# Patient Record
Sex: FEMALE | Race: WHITE | NOT HISPANIC OR LATINO
[De-identification: names, ages, dates, MRNs, and addresses within clinical notes are randomized per-mention and may not be internally consistent; named-entity substitution may affect disease eponyms.]

---

## 2018-03-09 ENCOUNTER — APPOINTMENT (OUTPATIENT)
Dept: HEART AND VASCULAR | Facility: CLINIC | Age: 33
End: 2018-03-09
Payer: OTHER GOVERNMENT

## 2018-03-09 VITALS
DIASTOLIC BLOOD PRESSURE: 82 MMHG | HEART RATE: 68 BPM | BODY MASS INDEX: 24.15 KG/M2 | SYSTOLIC BLOOD PRESSURE: 110 MMHG | RESPIRATION RATE: 24 BRPM | HEIGHT: 60 IN | WEIGHT: 123 LBS

## 2018-03-09 DIAGNOSIS — Z87.01 PERSONAL HISTORY OF PNEUMONIA (RECURRENT): ICD-10-CM

## 2018-03-09 PROCEDURE — 99204 OFFICE O/P NEW MOD 45 MIN: CPT

## 2018-03-09 PROCEDURE — 93228 REMOTE 30 DAY ECG REV/REPORT: CPT

## 2018-03-09 RX ORDER — OMEPRAZOLE 40 MG/1
40 CAPSULE, DELAYED RELEASE ORAL
Qty: 90 | Refills: 0 | Status: DISCONTINUED | COMMUNITY
Start: 2018-01-26

## 2018-03-09 RX ORDER — OMEPRAZOLE 40 MG/1
40 CAPSULE, DELAYED RELEASE ORAL
Qty: 90 | Refills: 0 | Status: ACTIVE | COMMUNITY
Start: 2018-01-26

## 2018-03-09 RX ORDER — CLARITHROMYCIN 250 MG/1
250 TABLET, FILM COATED ORAL
Qty: 20 | Refills: 0 | Status: DISCONTINUED | COMMUNITY
Start: 2017-12-01

## 2018-03-09 RX ORDER — BENZONATATE 100 MG/1
100 CAPSULE ORAL
Qty: 60 | Refills: 0 | Status: DISCONTINUED | COMMUNITY
Start: 2017-12-01

## 2018-03-12 ENCOUNTER — APPOINTMENT (OUTPATIENT)
Dept: HEART AND VASCULAR | Facility: CLINIC | Age: 33
End: 2018-03-12
Payer: OTHER GOVERNMENT

## 2018-03-12 PROCEDURE — 94010 BREATHING CAPACITY TEST: CPT | Mod: 59

## 2018-03-12 PROCEDURE — 94621 CARDIOPULM EXERCISE TESTING: CPT

## 2018-03-12 PROCEDURE — 94727 GAS DIL/WSHOT DETER LNG VOL: CPT

## 2018-03-12 PROCEDURE — 94729 DIFFUSING CAPACITY: CPT

## 2018-03-15 ENCOUNTER — APPOINTMENT (OUTPATIENT)
Dept: HEART AND VASCULAR | Facility: CLINIC | Age: 33
End: 2018-03-15
Payer: OTHER GOVERNMENT

## 2018-03-15 PROCEDURE — 93306 TTE W/DOPPLER COMPLETE: CPT

## 2018-03-16 ENCOUNTER — RESULT REVIEW (OUTPATIENT)
Age: 33
End: 2018-03-16

## 2018-03-22 ENCOUNTER — RX RENEWAL (OUTPATIENT)
Age: 33
End: 2018-03-22

## 2018-03-29 ENCOUNTER — RESULT REVIEW (OUTPATIENT)
Age: 33
End: 2018-03-29

## 2018-04-12 ENCOUNTER — RESULT REVIEW (OUTPATIENT)
Age: 33
End: 2018-04-12

## 2018-05-01 ENCOUNTER — RESULT REVIEW (OUTPATIENT)
Age: 33
End: 2018-05-01

## 2018-05-02 ENCOUNTER — RESULT REVIEW (OUTPATIENT)
Age: 33
End: 2018-05-02

## 2018-05-07 ENCOUNTER — APPOINTMENT (OUTPATIENT)
Dept: HEART AND VASCULAR | Facility: CLINIC | Age: 33
End: 2018-05-07
Payer: OTHER GOVERNMENT

## 2018-05-07 VITALS
WEIGHT: 119 LBS | RESPIRATION RATE: 20 BRPM | HEIGHT: 60 IN | SYSTOLIC BLOOD PRESSURE: 110 MMHG | BODY MASS INDEX: 23.36 KG/M2 | DIASTOLIC BLOOD PRESSURE: 70 MMHG | HEART RATE: 88 BPM

## 2018-05-07 PROCEDURE — 99214 OFFICE O/P EST MOD 30 MIN: CPT

## 2018-05-07 RX ORDER — METOPROLOL SUCCINATE 50 MG/1
50 TABLET, EXTENDED RELEASE ORAL
Qty: 2 | Refills: 0 | Status: DISCONTINUED | COMMUNITY
Start: 2018-03-22 | End: 2018-05-07

## 2018-06-05 ENCOUNTER — RX RENEWAL (OUTPATIENT)
Age: 33
End: 2018-06-05

## 2018-06-21 ENCOUNTER — APPOINTMENT (OUTPATIENT)
Dept: HEART AND VASCULAR | Facility: CLINIC | Age: 33
End: 2018-06-21
Payer: OTHER GOVERNMENT

## 2018-06-21 VITALS
WEIGHT: 121 LBS | RESPIRATION RATE: 24 BRPM | SYSTOLIC BLOOD PRESSURE: 90 MMHG | HEIGHT: 60 IN | DIASTOLIC BLOOD PRESSURE: 68 MMHG | BODY MASS INDEX: 23.75 KG/M2 | HEART RATE: 60 BPM

## 2018-06-21 PROCEDURE — 99214 OFFICE O/P EST MOD 30 MIN: CPT

## 2018-06-21 RX ORDER — DOXYCYCLINE HYCLATE 100 MG/1
100 TABLET ORAL
Qty: 14 | Refills: 0 | Status: DISCONTINUED | COMMUNITY
Start: 2018-05-31

## 2018-06-21 RX ORDER — BENZONATATE 200 MG/1
200 CAPSULE ORAL
Qty: 15 | Refills: 0 | Status: DISCONTINUED | COMMUNITY
Start: 2018-05-31

## 2018-06-21 RX ORDER — PREDNISONE 10 MG/1
10 TABLET ORAL
Qty: 30 | Refills: 0 | Status: DISCONTINUED | COMMUNITY
Start: 2018-05-31

## 2018-08-21 ENCOUNTER — RX RENEWAL (OUTPATIENT)
Age: 33
End: 2018-08-21

## 2018-12-06 ENCOUNTER — APPOINTMENT (OUTPATIENT)
Dept: HEART AND VASCULAR | Facility: CLINIC | Age: 33
End: 2018-12-06
Payer: OTHER GOVERNMENT

## 2018-12-06 VITALS
DIASTOLIC BLOOD PRESSURE: 54 MMHG | WEIGHT: 122 LBS | SYSTOLIC BLOOD PRESSURE: 107 MMHG | HEART RATE: 60 BPM | RESPIRATION RATE: 24 BRPM | HEIGHT: 60 IN | BODY MASS INDEX: 23.95 KG/M2

## 2018-12-06 PROCEDURE — 99214 OFFICE O/P EST MOD 30 MIN: CPT

## 2018-12-06 RX ORDER — MULTIVIT-MIN/VIT C/HERB NO.124 250-11.66
TABLET,CHEWABLE ORAL
Refills: 0 | Status: ACTIVE | COMMUNITY

## 2018-12-06 NOTE — REVIEW OF SYSTEMS
[Fever] : no fever [Headache] : no headache [Chills] : no chills [Feeling Fatigued] : not feeling fatigued [Abdominal Pain] : no abdominal pain [Nausea] : no nausea [Vomiting] : no vomiting [Heartburn] : no heartburn [Change in Appetite] : no change in appetite [Change In The Stool] : no change in stool [Dysphagia] : no dysphagia [Dizziness] : no dizziness [Tremor] : no tremor was seen [Numbness (Hypesthesia)] : no numbness [Convulsions] : no convulsions [Tingling (Paresthesia)] : no tingling [Negative] : Heme/Lymph

## 2018-12-06 NOTE — HISTORY OF PRESENT ILLNESS
[FreeTextEntry1] : Harper Coffman returns for follow up.  She denies cp, sob, pnd, orthopnea, edema, or loc.  She has fleeting lightheadedness with palps - much improved.\par \par She is active and compliant with meds.\par \par

## 2018-12-06 NOTE — DISCUSSION/SUMMARY
[Possible Cardiac Ischemia (Intermd Prob)] : possible cardiac ischemia (intermediate probability) [Stable] : stable [Deteriorating] : deteriorating [None] : none [Dietary Modification] : dietary modification [Exercise Regimen] : an exercise regimen [Vasovagal Syncope] : vasovagal syncope [Improving] : improving [Patient] : the patient [de-identified] : autonomic dysfxn

## 2018-12-06 NOTE — PHYSICAL EXAM
[General Appearance - Well Developed] : well developed [Normal Appearance] : normal appearance [Well Groomed] : well groomed [General Appearance - Well Nourished] : well nourished [No Deformities] : no deformities [General Appearance - In No Acute Distress] : no acute distress [Normal Conjunctiva] : the conjunctiva exhibited no abnormalities [Eyelids - No Xanthelasma] : the eyelids demonstrated no xanthelasmas [Normal Oral Mucosa] : normal oral mucosa [No Oral Pallor] : no oral pallor [No Oral Cyanosis] : no oral cyanosis [Normal Jugular Venous A Waves Present] : normal jugular venous A waves present [Normal Jugular Venous V Waves Present] : normal jugular venous V waves present [No Jugular Venous Clemens A Waves] : no jugular venous clemens A waves [Respiration, Rhythm And Depth] : normal respiratory rhythm and effort [Exaggerated Use Of Accessory Muscles For Inspiration] : no accessory muscle use [Auscultation Breath Sounds / Voice Sounds] : lungs were clear to auscultation bilaterally [Heart Rate And Rhythm] : heart rate and rhythm were normal [Heart Sounds] : normal S1 and S2 [Murmurs] : no murmurs present [Abdomen Soft] : soft [Abdomen Tenderness] : non-tender [Abdomen Mass (___ Cm)] : no abdominal mass palpated [Abnormal Walk] : normal gait [Gait - Sufficient For Exercise Testing] : the gait was sufficient for exercise testing [Nail Clubbing] : no clubbing of the fingernails [Cyanosis, Localized] : no localized cyanosis [Petechial Hemorrhages (___cm)] : no petechial hemorrhages [Skin Color & Pigmentation] : normal skin color and pigmentation [] : no rash [No Venous Stasis] : no venous stasis [Skin Lesions] : no skin lesions [No Skin Ulcers] : no skin ulcer [No Xanthoma] : no  xanthoma was observed

## 2018-12-06 NOTE — REASON FOR VISIT
[Follow-Up - Clinic] : a clinic follow-up of [Coronary Artery Disease] : coronary artery disease [Syncope] : syncope

## 2019-03-11 ENCOUNTER — APPOINTMENT (OUTPATIENT)
Dept: HEART AND VASCULAR | Facility: CLINIC | Age: 34
End: 2019-03-11
Payer: OTHER GOVERNMENT

## 2019-03-11 VITALS
BODY MASS INDEX: 23.95 KG/M2 | HEART RATE: 59 BPM | DIASTOLIC BLOOD PRESSURE: 62 MMHG | SYSTOLIC BLOOD PRESSURE: 102 MMHG | WEIGHT: 122 LBS | HEIGHT: 60 IN

## 2019-03-11 PROCEDURE — 93351 STRESS TTE COMPLETE: CPT

## 2019-03-11 PROCEDURE — 93325 DOPPLER ECHO COLOR FLOW MAPG: CPT

## 2019-03-11 PROCEDURE — 93320 DOPPLER ECHO COMPLETE: CPT

## 2019-03-12 ENCOUNTER — RESULT REVIEW (OUTPATIENT)
Age: 34
End: 2019-03-12

## 2019-03-18 ENCOUNTER — APPOINTMENT (OUTPATIENT)
Dept: HEART AND VASCULAR | Facility: CLINIC | Age: 34
End: 2019-03-18
Payer: OTHER GOVERNMENT

## 2019-03-18 PROCEDURE — 94727 GAS DIL/WSHOT DETER LNG VOL: CPT

## 2019-03-18 PROCEDURE — 94729 DIFFUSING CAPACITY: CPT

## 2019-03-18 PROCEDURE — 94010 BREATHING CAPACITY TEST: CPT | Mod: 59

## 2019-03-18 PROCEDURE — 94621 CARDIOPULM EXERCISE TESTING: CPT

## 2019-04-01 ENCOUNTER — APPOINTMENT (OUTPATIENT)
Dept: HEART AND VASCULAR | Facility: CLINIC | Age: 34
End: 2019-04-01
Payer: OTHER GOVERNMENT

## 2019-04-01 VITALS
HEART RATE: 64 BPM | HEIGHT: 60 IN | BODY MASS INDEX: 23.56 KG/M2 | RESPIRATION RATE: 24 BRPM | SYSTOLIC BLOOD PRESSURE: 96 MMHG | WEIGHT: 120 LBS | DIASTOLIC BLOOD PRESSURE: 64 MMHG

## 2019-04-01 PROCEDURE — 99214 OFFICE O/P EST MOD 30 MIN: CPT

## 2019-04-01 RX ORDER — TINIDAZOLE 500 MG/1
500 TABLET, FILM COATED ORAL
Qty: 4 | Refills: 0 | Status: DISCONTINUED | COMMUNITY
Start: 2019-01-10

## 2019-04-01 NOTE — REASON FOR VISIT
[Follow-Up - Clinic] : a clinic follow-up of [Coronary Artery Disease] : coronary artery disease [FreeTextEntry1] : autonomic dysfxn

## 2019-04-01 NOTE — HISTORY OF PRESENT ILLNESS
[FreeTextEntry1] : Harper Coffman returns for follow up.  She has occasional sob, flutter sensation, and lightheadedness.  She denies cp, pnd, orthopnea, edema, or loc.  \par \par She is active but not exercising.  She is compliant with meds.\par \par EXSE 3/2019: nl lv sys fxn; nl rojo fxn; 11:10 min Aldair; boderline/pos ECG; no ischemia byWM\par CPET 3/2019: ischemic myocardial dysfxn; 61% predicted peak VO2\par \par Reviewed results in detail.  Recommend increase exercise and continue supplements/meds.

## 2019-04-01 NOTE — DISCUSSION/SUMMARY
[Possible Cardiac Ischemia (Intermd Prob)] : possible cardiac ischemia (intermediate probability) [Stable] : stable [Deteriorating] : deteriorating [None] : none [Dietary Modification] : dietary modification [Exercise Regimen] : an exercise regimen [Patient] : the patient [de-identified] : endothelial dysfxn [FreeTextEntry1] : autonomic dysfxn - stable

## 2019-04-11 ENCOUNTER — APPOINTMENT (OUTPATIENT)
Dept: HEART AND VASCULAR | Facility: CLINIC | Age: 34
End: 2019-04-11
Payer: OTHER GOVERNMENT

## 2019-04-11 VITALS
BODY MASS INDEX: 23.56 KG/M2 | RESPIRATION RATE: 14 BRPM | WEIGHT: 120 LBS | SYSTOLIC BLOOD PRESSURE: 112 MMHG | DIASTOLIC BLOOD PRESSURE: 72 MMHG | HEIGHT: 60 IN | HEART RATE: 80 BPM

## 2019-04-11 PROCEDURE — 99214 OFFICE O/P EST MOD 30 MIN: CPT

## 2019-04-11 RX ORDER — BACILLUS COAGULANS/INULIN 1B-250 MG
CAPSULE ORAL
Refills: 0 | Status: DISCONTINUED | COMMUNITY
End: 2019-04-11

## 2019-04-11 NOTE — HISTORY OF PRESENT ILLNESS
[FreeTextEntry1] : Harper Coffman returns for follow up.  She was treated for sinus infection with po steroids and antibiotics last week.   A few days ago, she developed "funny" sensation in her chest and breathing while in a store after feeling like she was going to faint.  She did not lose consciousness.  The next couple of days she was feeling tired and "racy" in her chest.  She denies cp, pnd, orthopnea, edema.  [facility] HV eval was negative.\par \par Today, she denies cp, sob, pnd, orthopnea, edema, palp, or loc.\par \par She is off steroid taper and taking amoxicillin only.\par \par She is active again and compliant with meds.\par \par Symptoms likely side effect of steroids in setting of autonomic dysfxn.

## 2019-04-11 NOTE — DISCUSSION/SUMMARY
[Possible Cardiac Ischemia (Intermd Prob)] : possible cardiac ischemia (intermediate probability) [Deteriorating] : deteriorating [None] : none [Dietary Modification] : dietary modification [Exercise Regimen] : an exercise regimen [Vasovagal Syncope] : vasovagal syncope [Stable] : stable [Patient] : the patient [de-identified] : autonomic dysfxn

## 2019-04-11 NOTE — PHYSICAL EXAM
[Normal Appearance] : normal appearance [Well Groomed] : well groomed [General Appearance - Well Developed] : well developed [General Appearance - Well Nourished] : well nourished [No Deformities] : no deformities [General Appearance - In No Acute Distress] : no acute distress [Eyelids - No Xanthelasma] : the eyelids demonstrated no xanthelasmas [Normal Conjunctiva] : the conjunctiva exhibited no abnormalities [Normal Oral Mucosa] : normal oral mucosa [No Oral Pallor] : no oral pallor [No Oral Cyanosis] : no oral cyanosis [Normal Jugular Venous A Waves Present] : normal jugular venous A waves present [Normal Jugular Venous V Waves Present] : normal jugular venous V waves present [No Jugular Venous Clemens A Waves] : no jugular venous clemens A waves [Respiration, Rhythm And Depth] : normal respiratory rhythm and effort [Exaggerated Use Of Accessory Muscles For Inspiration] : no accessory muscle use [Auscultation Breath Sounds / Voice Sounds] : lungs were clear to auscultation bilaterally [Heart Rate And Rhythm] : heart rate and rhythm were normal [Heart Sounds] : normal S1 and S2 [Murmurs] : no murmurs present [Abdomen Soft] : soft [Abdomen Tenderness] : non-tender [Abdomen Mass (___ Cm)] : no abdominal mass palpated [Abnormal Walk] : normal gait [Gait - Sufficient For Exercise Testing] : the gait was sufficient for exercise testing [Nail Clubbing] : no clubbing of the fingernails [Cyanosis, Localized] : no localized cyanosis [Petechial Hemorrhages (___cm)] : no petechial hemorrhages [Skin Color & Pigmentation] : normal skin color and pigmentation [] : no rash [No Venous Stasis] : no venous stasis [No Skin Ulcers] : no skin ulcer [Skin Lesions] : no skin lesions [No Xanthoma] : no  xanthoma was observed

## 2019-04-11 NOTE — REVIEW OF SYSTEMS
[Fever] : no fever [Chills] : no chills [Headache] : no headache [Feeling Fatigued] : not feeling fatigued [Nausea] : no nausea [Vomiting] : no vomiting [Abdominal Pain] : no abdominal pain [Change in Appetite] : no change in appetite [Change In The Stool] : no change in stool [Heartburn] : no heartburn [Dizziness] : no dizziness [Dysphagia] : no dysphagia [Tremor] : no tremor was seen [Convulsions] : no convulsions [Tingling (Paresthesia)] : no tingling [Numbness (Hypesthesia)] : no numbness [Negative] : Endocrine

## 2019-04-11 NOTE — REASON FOR VISIT
[Coronary Artery Disease] : coronary artery disease [Follow-Up - Clinic] : a clinic follow-up of [FreeTextEntry1] : autonomic dysfxn

## 2019-08-16 ENCOUNTER — RX RENEWAL (OUTPATIENT)
Age: 34
End: 2019-08-16

## 2019-09-09 ENCOUNTER — RX RENEWAL (OUTPATIENT)
Age: 34
End: 2019-09-09

## 2019-11-04 ENCOUNTER — APPOINTMENT (OUTPATIENT)
Dept: HEART AND VASCULAR | Facility: CLINIC | Age: 34
End: 2019-11-04
Payer: OTHER GOVERNMENT

## 2019-11-04 VITALS
BODY MASS INDEX: 23.16 KG/M2 | RESPIRATION RATE: 16 BRPM | SYSTOLIC BLOOD PRESSURE: 90 MMHG | WEIGHT: 118 LBS | DIASTOLIC BLOOD PRESSURE: 68 MMHG | HEART RATE: 68 BPM | HEIGHT: 60 IN

## 2019-11-04 PROCEDURE — 99214 OFFICE O/P EST MOD 30 MIN: CPT

## 2019-11-04 RX ORDER — CIDER VINEGAR 300 MG
TABLET ORAL
Refills: 0 | Status: DISCONTINUED | COMMUNITY
End: 2019-11-04

## 2019-11-04 NOTE — REASON FOR VISIT
[Follow-Up - Clinic] : a clinic follow-up of [Coronary Artery Disease] : coronary artery disease [Palpitations] : palpitations [FreeTextEntry1] : autonomic dysfxn

## 2019-11-04 NOTE — REVIEW OF SYSTEMS
[Fever] : no fever [Headache] : no headache [Chills] : no chills [Feeling Fatigued] : feeling fatigued [Abdominal Pain] : no abdominal pain [Nausea] : no nausea [Vomiting] : no vomiting [Heartburn] : no heartburn [Change in Appetite] : no change in appetite [Change In The Stool] : no change in stool [Dysphagia] : no dysphagia [Dizziness] : no dizziness [Tremor] : no tremor was seen [Numbness (Hypesthesia)] : no numbness [Convulsions] : no convulsions [Tingling (Paresthesia)] : no tingling [Negative] : Heme/Lymph

## 2019-11-04 NOTE — HISTORY OF PRESENT ILLNESS
[FreeTextEntry1] : Harper Coffman returns for follow up. She notes having a  "funny" sensation in her chest  - palps moving up her chest lasting a few seconds.  She states that the feeling could be "racy" in nature.  She denies cp, pnd, orthopnea, edema. She feels exhausted and "cloudy".\par \par She is active (but not exercising) and compliant with meds.\par \par Reassurance provided.  Encouraged exercise and increase po water intake.

## 2019-11-04 NOTE — DISCUSSION/SUMMARY
[Possible Cardiac Ischemia (Intermd Prob)] : possible cardiac ischemia (intermediate probability) [Deteriorating] : deteriorating [Dietary Modification] : dietary modification [Exercise Regimen] : an exercise regimen [Rhythm Disorder] : rhythm disorder [None] : There are no changes in medication management [Vasovagal Syncope] : vasovagal syncope [Stable] : stable [Patient] : the patient [de-identified] : autonomic dysfxn

## 2019-12-26 ENCOUNTER — APPOINTMENT (OUTPATIENT)
Dept: HEART AND VASCULAR | Facility: CLINIC | Age: 34
End: 2019-12-26
Payer: OTHER GOVERNMENT

## 2019-12-26 VITALS
BODY MASS INDEX: 23.56 KG/M2 | RESPIRATION RATE: 12 BRPM | WEIGHT: 120 LBS | DIASTOLIC BLOOD PRESSURE: 76 MMHG | HEART RATE: 66 BPM | SYSTOLIC BLOOD PRESSURE: 118 MMHG | HEIGHT: 60 IN

## 2019-12-26 PROCEDURE — 99215 OFFICE O/P EST HI 40 MIN: CPT

## 2019-12-26 NOTE — HISTORY OF PRESENT ILLNESS
[FreeTextEntry1] : Harper Coffman returns for follow up. Approximately one week ago, she notes "feeling off" and having a  racy sensation in her chest  - feeling unsteady/unsettled.  She notes having some churning in her stomach, then pain.  She went to the bathroom and had a bowel movement - giving her a sensation of purging - suddenly diaphoretic, cold, and the room darkening.  She states she was not able to talk.  Her symptoms resolved after several minutes.  She continues to feel tired and "off".\par \par She denies cp, pnd, orthopnea, edema.\par \par She is active and exercising.  She remains compliant with meds.\par \par Reassurance provided.  Encouraged continued exercise and increase po water intake.\par \par May consider stopping beta blocker and trying SSRI.

## 2019-12-26 NOTE — PHYSICAL EXAM
[Well Groomed] : well groomed [Normal Appearance] : normal appearance [General Appearance - Well Developed] : well developed [General Appearance - Well Nourished] : well nourished [No Deformities] : no deformities [General Appearance - In No Acute Distress] : no acute distress [Normal Conjunctiva] : the conjunctiva exhibited no abnormalities [Eyelids - No Xanthelasma] : the eyelids demonstrated no xanthelasmas [No Oral Pallor] : no oral pallor [Normal Oral Mucosa] : normal oral mucosa [Normal Jugular Venous A Waves Present] : normal jugular venous A waves present [No Oral Cyanosis] : no oral cyanosis [Normal Jugular Venous V Waves Present] : normal jugular venous V waves present [No Jugular Venous Clemens A Waves] : no jugular venous clemens A waves [Respiration, Rhythm And Depth] : normal respiratory rhythm and effort [Auscultation Breath Sounds / Voice Sounds] : lungs were clear to auscultation bilaterally [Exaggerated Use Of Accessory Muscles For Inspiration] : no accessory muscle use [Heart Rate And Rhythm] : heart rate and rhythm were normal [Heart Sounds] : normal S1 and S2 [Murmurs] : no murmurs present [Abdomen Soft] : soft [Abdomen Tenderness] : non-tender [Abdomen Mass (___ Cm)] : no abdominal mass palpated [Abnormal Walk] : normal gait [Gait - Sufficient For Exercise Testing] : the gait was sufficient for exercise testing [Nail Clubbing] : no clubbing of the fingernails [Petechial Hemorrhages (___cm)] : no petechial hemorrhages [Cyanosis, Localized] : no localized cyanosis [Skin Color & Pigmentation] : normal skin color and pigmentation [] : no rash [Skin Lesions] : no skin lesions [No Venous Stasis] : no venous stasis [No Xanthoma] : no  xanthoma was observed [No Skin Ulcers] : no skin ulcer

## 2019-12-26 NOTE — REVIEW OF SYSTEMS
[Fever] : no fever [Headache] : no headache [Chills] : no chills [Feeling Fatigued] : feeling fatigued [Abdominal Pain] : no abdominal pain [Vomiting] : no vomiting [Nausea] : no nausea [Heartburn] : no heartburn [Change in Appetite] : no change in appetite [Change In The Stool] : no change in stool [Dysphagia] : no dysphagia [Dizziness] : no dizziness [Tremor] : no tremor was seen [Numbness (Hypesthesia)] : no numbness [Convulsions] : no convulsions [Tingling (Paresthesia)] : no tingling [Negative] : Psychiatric

## 2019-12-26 NOTE — DISCUSSION/SUMMARY
[Possible Cardiac Ischemia (Intermd Prob)] : possible cardiac ischemia (intermediate probability) [Deteriorating] : deteriorating [Exercise Regimen] : an exercise regimen [Dietary Modification] : dietary modification [Rhythm Disorder] : rhythm disorder [None] : There are no changes in medication management [Vasovagal Syncope] : vasovagal syncope [Stable] : stable [Patient] : the patient [de-identified] : autonomic dysfxn

## 2020-01-23 ENCOUNTER — APPOINTMENT (OUTPATIENT)
Dept: HEART AND VASCULAR | Facility: CLINIC | Age: 35
End: 2020-01-23
Payer: OTHER GOVERNMENT

## 2020-01-23 VITALS
RESPIRATION RATE: 12 BRPM | HEART RATE: 60 BPM | BODY MASS INDEX: 2.35 KG/M2 | SYSTOLIC BLOOD PRESSURE: 118 MMHG | OXYGEN SATURATION: 99 % | HEIGHT: 60 IN | WEIGHT: 12 LBS | DIASTOLIC BLOOD PRESSURE: 78 MMHG

## 2020-01-23 PROCEDURE — 99214 OFFICE O/P EST MOD 30 MIN: CPT

## 2020-01-23 RX ORDER — HYDROXYZINE HYDROCHLORIDE 10 MG/1
10 TABLET ORAL
Refills: 0 | Status: DISCONTINUED | COMMUNITY
End: 2020-01-23

## 2020-01-23 NOTE — DISCUSSION/SUMMARY
[Possible Cardiac Ischemia (Intermd Prob)] : possible cardiac ischemia (intermediate probability) [Dietary Modification] : dietary modification [Exercise Regimen] : an exercise regimen [Rhythm Disorder] : rhythm disorder [None] : There are no changes in medication management [Vasovagal Syncope] : vasovagal syncope [Patient] : the patient [Stable] : stable [de-identified] : autonomic dysfxn

## 2020-01-23 NOTE — REVIEW OF SYSTEMS
[Fever] : no fever [Chills] : no chills [Headache] : no headache [Feeling Fatigued] : not feeling fatigued [Abdominal Pain] : no abdominal pain [Nausea] : no nausea [Vomiting] : no vomiting [Change in Appetite] : no change in appetite [Change In The Stool] : no change in stool [Dysphagia] : no dysphagia [Heartburn] : no heartburn [Tremor] : no tremor was seen [Dizziness] : no dizziness [Tingling (Paresthesia)] : no tingling [Convulsions] : no convulsions [Numbness (Hypesthesia)] : no numbness [Negative] : Heme/Lymph

## 2020-01-23 NOTE — PHYSICAL EXAM
[General Appearance - Well Developed] : well developed [Well Groomed] : well groomed [No Deformities] : no deformities [General Appearance - Well Nourished] : well nourished [Normal Appearance] : normal appearance [Normal Conjunctiva] : the conjunctiva exhibited no abnormalities [Eyelids - No Xanthelasma] : the eyelids demonstrated no xanthelasmas [General Appearance - In No Acute Distress] : no acute distress [No Oral Cyanosis] : no oral cyanosis [No Oral Pallor] : no oral pallor [Normal Oral Mucosa] : normal oral mucosa [Normal Jugular Venous V Waves Present] : normal jugular venous V waves present [No Jugular Venous Clemens A Waves] : no jugular venous clemens A waves [Normal Jugular Venous A Waves Present] : normal jugular venous A waves present [Exaggerated Use Of Accessory Muscles For Inspiration] : no accessory muscle use [Respiration, Rhythm And Depth] : normal respiratory rhythm and effort [Heart Rate And Rhythm] : heart rate and rhythm were normal [Auscultation Breath Sounds / Voice Sounds] : lungs were clear to auscultation bilaterally [Heart Sounds] : normal S1 and S2 [Murmurs] : no murmurs present [Abdomen Soft] : soft [Abdomen Tenderness] : non-tender [Abdomen Mass (___ Cm)] : no abdominal mass palpated [Abnormal Walk] : normal gait [Gait - Sufficient For Exercise Testing] : the gait was sufficient for exercise testing [Cyanosis, Localized] : no localized cyanosis [Nail Clubbing] : no clubbing of the fingernails [Petechial Hemorrhages (___cm)] : no petechial hemorrhages [No Venous Stasis] : no venous stasis [Skin Lesions] : no skin lesions [Skin Color & Pigmentation] : normal skin color and pigmentation [] : no rash [No Xanthoma] : no  xanthoma was observed [No Skin Ulcers] : no skin ulcer

## 2020-01-23 NOTE — HISTORY OF PRESENT ILLNESS
[FreeTextEntry1] : Harper Coffman returns for follow up. \par \par She denies cp, pnd, orthopnea, edema.  She continues to have "anxious" like feelings especially pre-menses. \par \par She is active and exercising.  She remains compliant with meds.\par \par Reassurance provided.  Encouraged continued exercise and increase po water intake.\par \par Continue meds.  Obtain GYN records.

## 2020-02-24 ENCOUNTER — APPOINTMENT (OUTPATIENT)
Dept: HEART AND VASCULAR | Facility: CLINIC | Age: 35
End: 2020-02-24

## 2020-03-05 ENCOUNTER — APPOINTMENT (OUTPATIENT)
Dept: HEART AND VASCULAR | Facility: CLINIC | Age: 35
End: 2020-03-05
Payer: OTHER GOVERNMENT

## 2020-03-05 VITALS
HEART RATE: 57 BPM | BODY MASS INDEX: 23.56 KG/M2 | SYSTOLIC BLOOD PRESSURE: 94 MMHG | HEIGHT: 60 IN | DIASTOLIC BLOOD PRESSURE: 64 MMHG | WEIGHT: 120 LBS

## 2020-03-05 PROCEDURE — 93351 STRESS TTE COMPLETE: CPT

## 2020-03-05 PROCEDURE — 93325 DOPPLER ECHO COLOR FLOW MAPG: CPT

## 2020-03-05 PROCEDURE — 93320 DOPPLER ECHO COMPLETE: CPT

## 2020-07-06 ENCOUNTER — APPOINTMENT (OUTPATIENT)
Dept: HEART AND VASCULAR | Facility: CLINIC | Age: 35
End: 2020-07-06

## 2020-07-27 ENCOUNTER — APPOINTMENT (OUTPATIENT)
Dept: HEART AND VASCULAR | Facility: CLINIC | Age: 35
End: 2020-07-27

## 2020-07-30 ENCOUNTER — APPOINTMENT (OUTPATIENT)
Dept: HEART AND VASCULAR | Facility: CLINIC | Age: 35
End: 2020-07-30

## 2020-08-22 ENCOUNTER — NON-APPOINTMENT (OUTPATIENT)
Age: 35
End: 2020-08-22

## 2020-12-01 ENCOUNTER — RX RENEWAL (OUTPATIENT)
Age: 35
End: 2020-12-01

## 2021-02-13 ENCOUNTER — RX RENEWAL (OUTPATIENT)
Age: 36
End: 2021-02-13

## 2021-06-13 ENCOUNTER — NON-APPOINTMENT (OUTPATIENT)
Age: 36
End: 2021-06-13

## 2021-07-12 ENCOUNTER — RX RENEWAL (OUTPATIENT)
Age: 36
End: 2021-07-12

## 2021-11-03 ENCOUNTER — RX RENEWAL (OUTPATIENT)
Age: 36
End: 2021-11-03

## 2022-02-25 ENCOUNTER — APPOINTMENT (OUTPATIENT)
Dept: HEART AND VASCULAR | Facility: CLINIC | Age: 37
End: 2022-02-25
Payer: OTHER GOVERNMENT

## 2022-02-25 DIAGNOSIS — R06.02 SHORTNESS OF BREATH: ICD-10-CM

## 2022-02-25 DIAGNOSIS — R07.89 OTHER CHEST PAIN: ICD-10-CM

## 2022-02-25 PROCEDURE — 99443: CPT

## 2022-02-25 RX ORDER — ATENOLOL 25 MG/1
25 TABLET ORAL
Qty: 45 | Refills: 3 | Status: DISCONTINUED | COMMUNITY
Start: 2018-05-07 | End: 2022-02-25

## 2022-02-25 NOTE — REASON FOR VISIT
[Symptom and Test Evaluation] : symptom and test evaluation [FreeTextEntry3] : Belen Pollock [Follow-Up - Clinic] : a clinic follow-up of [Coronary Artery Disease] : coronary artery disease [Palpitations] : palpitations [FreeTextEntry1] : autonomic dysfxn

## 2022-02-25 NOTE — DISCUSSION/SUMMARY
[Non-Cardiac] : non-cardiac chest pain [Possible Cardiac Ischemia (Intermd Prob)] : possible cardiac ischemia (intermediate probability) [Dietary Modification] : dietary modification [Exercise Regimen] : an exercise regimen [Rhythm Disorder] : rhythm disorder [Deteriorating] : deteriorating [Low Sodium Diet] : low sodium diet [Vasovagal Syncope] : vasovagal syncope [Stable] : stable [None] : There are no changes in medication management [Patient] : the patient [de-identified] : COOPER [de-identified] : autonomic dysfxn

## 2022-02-25 NOTE — REVIEW OF SYSTEMS
[Fever] : no fever [Headache] : no headache [Chills] : no chills [Feeling Fatigued] : feeling fatigued [Cough] : cough [Wheezing] : no wheezing [Coughing Up Blood] : no hemoptysis [Snoring] : no snoring [Confusion] : no confusion was observed [Memory Lapses Or Loss] : no memory lapses or loss [Depression] : no depression [Anxiety] : anxiety [Under Stress] : not under stress [Suicidal] : not suicidal [Negative] : Heme/Lymph

## 2022-02-25 NOTE — HISTORY OF PRESENT ILLNESS
[FreeTextEntry1] : Harper Coffman requests televideo/telephone follow up.  Consent obtained and recorded.  She is at her home and doctor is in [place], [place].  \par \par In late January 2022, she contract SARS CoV 2 infection.  She has sinus congestion, cough, sore throat, fatigue.  She has recovered for the most part. \par \par Two weeks ago, while at work, she developed lightheadedness (room shifted) with position change.  She was unsteady.  She also noted having "white puffy spots" in her visual field that was transient.  She was feeling "jittery/fluttery" in her chest and throat.  She developed chest heavy and "breathy" feeling (intermittent).  She also noted intermittent hand tingling.  \par \par She is active.  She remains compliant with meds.\par \par Clinical hx reviewed in detail.\par \par PE from 1/2020 eval.  \par \par Recommendations:\par 1. reassurance provided\par 2. hydrate and eat\par 3. blood work/ECG\par 4. ZIO\par 5. EXSE with strain/CPET\par 6. continue current meds for now

## 2022-03-03 ENCOUNTER — APPOINTMENT (OUTPATIENT)
Dept: HEART AND VASCULAR | Facility: CLINIC | Age: 37
End: 2022-03-03
Payer: OTHER GOVERNMENT

## 2022-03-03 DIAGNOSIS — Z00.00 ENCOUNTER FOR GENERAL ADULT MEDICAL EXAMINATION W/OUT ABNORMAL FINDINGS: ICD-10-CM

## 2022-03-03 PROCEDURE — 36415 COLL VENOUS BLD VENIPUNCTURE: CPT

## 2022-03-04 LAB
ALBUMIN SERPL ELPH-MCNC: 5 G/DL
ALP BLD-CCNC: 57 U/L
ALT SERPL-CCNC: 27 U/L
ANION GAP SERPL CALC-SCNC: 12 MMOL/L
AST SERPL-CCNC: 24 U/L
BASOPHILS # BLD AUTO: 0.02 K/UL
BASOPHILS NFR BLD AUTO: 0.4 %
BILIRUB SERPL-MCNC: 0.3 MG/DL
BUN SERPL-MCNC: 14 MG/DL
CALCIUM SERPL-MCNC: 9.2 MG/DL
CHLORIDE SERPL-SCNC: 110 MMOL/L
CHOLEST SERPL-MCNC: 181 MG/DL
CO2 SERPL-SCNC: 20 MMOL/L
CREAT SERPL-MCNC: 0.77 MG/DL
EGFR: 102 ML/MIN/1.73M2
EOSINOPHIL # BLD AUTO: 0.1 K/UL
EOSINOPHIL NFR BLD AUTO: 1.9 %
GLUCOSE SERPL-MCNC: 89 MG/DL
HCT VFR BLD CALC: 36.3 %
HDLC SERPL-MCNC: 52 MG/DL
HGB BLD-MCNC: 11.4 G/DL
IMM GRANULOCYTES NFR BLD AUTO: 0.2 %
LDLC SERPL CALC-MCNC: 112 MG/DL
LYMPHOCYTES # BLD AUTO: 1.71 K/UL
LYMPHOCYTES NFR BLD AUTO: 32.9 %
MAN DIFF?: NORMAL
MCHC RBC-ENTMCNC: 31.4 GM/DL
MCHC RBC-ENTMCNC: 32.2 PG
MCV RBC AUTO: 102.5 FL
MONOCYTES # BLD AUTO: 0.35 K/UL
MONOCYTES NFR BLD AUTO: 6.7 %
NEUTROPHILS # BLD AUTO: 3 K/UL
NEUTROPHILS NFR BLD AUTO: 57.9 %
NONHDLC SERPL-MCNC: 129 MG/DL
PLATELET # BLD AUTO: 232 K/UL
POTASSIUM SERPL-SCNC: 4.2 MMOL/L
PROT SERPL-MCNC: 7.2 G/DL
RBC # BLD: 3.54 M/UL
RBC # FLD: 13.1 %
SODIUM SERPL-SCNC: 142 MMOL/L
TRIGL SERPL-MCNC: 82 MG/DL
WBC # FLD AUTO: 5.19 K/UL

## 2022-03-07 ENCOUNTER — APPOINTMENT (OUTPATIENT)
Dept: HEART AND VASCULAR | Facility: CLINIC | Age: 37
End: 2022-03-07
Payer: OTHER GOVERNMENT

## 2022-03-07 VITALS
OXYGEN SATURATION: 99 % | DIASTOLIC BLOOD PRESSURE: 80 MMHG | WEIGHT: 125 LBS | HEIGHT: 60 IN | SYSTOLIC BLOOD PRESSURE: 116 MMHG | TEMPERATURE: 97.9 F | BODY MASS INDEX: 24.54 KG/M2

## 2022-03-07 PROCEDURE — 94621 CARDIOPULM EXERCISE TESTING: CPT

## 2022-03-07 PROCEDURE — 94727 GAS DIL/WSHOT DETER LNG VOL: CPT

## 2022-03-07 PROCEDURE — 94729 DIFFUSING CAPACITY: CPT

## 2022-03-07 PROCEDURE — 94010 BREATHING CAPACITY TEST: CPT | Mod: 59

## 2022-03-09 ENCOUNTER — NON-APPOINTMENT (OUTPATIENT)
Age: 37
End: 2022-03-09

## 2022-03-29 ENCOUNTER — NON-APPOINTMENT (OUTPATIENT)
Age: 37
End: 2022-03-29

## 2022-03-31 ENCOUNTER — NON-APPOINTMENT (OUTPATIENT)
Age: 37
End: 2022-03-31

## 2022-03-31 ENCOUNTER — APPOINTMENT (OUTPATIENT)
Dept: HEART AND VASCULAR | Facility: CLINIC | Age: 37
End: 2022-03-31
Payer: OTHER GOVERNMENT

## 2022-03-31 VITALS
SYSTOLIC BLOOD PRESSURE: 118 MMHG | WEIGHT: 125 LBS | DIASTOLIC BLOOD PRESSURE: 68 MMHG | HEIGHT: 60 IN | BODY MASS INDEX: 24.54 KG/M2 | OXYGEN SATURATION: 98 % | HEART RATE: 66 BPM

## 2022-03-31 PROCEDURE — 93325 DOPPLER ECHO COLOR FLOW MAPG: CPT

## 2022-03-31 PROCEDURE — 93320 DOPPLER ECHO COMPLETE: CPT

## 2022-03-31 PROCEDURE — 93351 STRESS TTE COMPLETE: CPT

## 2022-04-05 ENCOUNTER — APPOINTMENT (OUTPATIENT)
Dept: HEART AND VASCULAR | Facility: CLINIC | Age: 37
End: 2022-04-05
Payer: OTHER GOVERNMENT

## 2022-04-05 PROCEDURE — 99443: CPT

## 2022-04-05 NOTE — HISTORY OF PRESENT ILLNESS
[FreeTextEntry1] : Harper Coffman requests televideo/telephone follow up.  Consent obtained and recorded.  She is at her home and doctor is in [place], [place].  \par \par In late January 2022, she contract SARS CoV 2 infection.  She has essentially recovered from the initial symptoms of sinus congestion, cough, sore throat, fatigue.\par \par She continues to have intermittent lightheadedness (room shifted) with position change.  She is transiently unsteady.  She also noted having "white puffy spots" in her visual field that has been transient.  She feels "jittery/fluttery" in her chest and throat.  \par \par She is active.  She remains compliant with meds.\par \par ZIO 3/2022: APC/PVC; SVT\par CPET 3/2022: ischemic myocardial dysfxn; 86% predicted peak VO2 \par EXSE 3/2022: nl lv sys fxn; nl rojo fxn; 10:30 min Aldair; pos ECG; no ischemia by WM\par \par Clinical hx and results reviewed in detail.\par \par PE from 1/2020 eval.  \par \par Recommendations:\par 1. reassurance provided\par 2. hydrate and eat\par 3. exercise\par 4. continue current meds for now\par 5. Neurology eval\par 6. f/u in 4 months

## 2022-04-05 NOTE — REASON FOR VISIT
[Symptom and Test Evaluation] : symptom and test evaluation [Arrhythmia/ECG Abnorrmalities] : arrhythmia/ECG abnormalities [Coronary Artery Disease] : coronary artery disease [FreeTextEntry3] : Belen Pollock

## 2022-04-05 NOTE — DISCUSSION/SUMMARY
[Possible Cardiac Ischemia (Intermd Prob)] : possible cardiac ischemia (intermediate probability) [Dietary Modification] : dietary modification [Exercise Regimen] : an exercise regimen [PVCs] : ectopic ventricular beats [SVT] : paroxysmal supraventricular tachycardia [Vasovagal Syncope] : vasovagal syncope [Stable] : stable [None] : There are no changes in medication management [Patient] : the patient [de-identified] : autonomic dysfxn

## 2022-04-05 NOTE — REVIEW OF SYSTEMS
[Fever] : no fever [Headache] : no headache [Chills] : no chills [Feeling Fatigued] : feeling fatigued [Cough] : no cough [Wheezing] : no wheezing [Coughing Up Blood] : no hemoptysis [Snoring] : no snoring [Dizziness] : dizziness [Tremor] : no tremor was seen [Numbness (Hypoesthesia)] : no numbness [Convulsions] : no convulsions [Tingling (Paresthesia)] : no tingling [Weakness] : no weakness [Limb Weakness (Paresis)] : no limb weakness (Paresis) [Speech Disturbance] : no speech disturbance [Confusion] : no confusion was observed [Memory Lapses Or Loss] : no memory lapses or loss [Depression] : no depression [Anxiety] : no anxiety [Under Stress] : not under stress [Suicidal] : not suicidal [Negative] : Heme/Lymph [FreeTextEntry2] : uneasy/unsteady feeling

## 2022-05-02 ENCOUNTER — APPOINTMENT (OUTPATIENT)
Dept: NEUROLOGY | Facility: CLINIC | Age: 37
End: 2022-05-02
Payer: OTHER GOVERNMENT

## 2022-05-02 VITALS
WEIGHT: 125 LBS | BODY MASS INDEX: 24.54 KG/M2 | HEIGHT: 60 IN | DIASTOLIC BLOOD PRESSURE: 69 MMHG | HEART RATE: 74 BPM | SYSTOLIC BLOOD PRESSURE: 101 MMHG

## 2022-05-02 DIAGNOSIS — U07.1 COVID-19: ICD-10-CM

## 2022-05-02 DIAGNOSIS — Z82.49 FAMILY HISTORY OF ISCHEMIC HEART DISEASE AND OTHER DISEASES OF THE CIRCULATORY SYSTEM: ICD-10-CM

## 2022-05-02 DIAGNOSIS — Z87.898 PERSONAL HISTORY OF OTHER SPECIFIED CONDITIONS: ICD-10-CM

## 2022-05-02 DIAGNOSIS — N83.209 UNSPECIFIED OVARIAN CYST, UNSPECIFIED SIDE: ICD-10-CM

## 2022-05-02 DIAGNOSIS — H93.19 TINNITUS, UNSPECIFIED EAR: ICD-10-CM

## 2022-05-02 DIAGNOSIS — Z87.891 PERSONAL HISTORY OF NICOTINE DEPENDENCE: ICD-10-CM

## 2022-05-02 DIAGNOSIS — Z78.9 OTHER SPECIFIED HEALTH STATUS: ICD-10-CM

## 2022-05-02 PROCEDURE — 99244 OFF/OP CNSLTJ NEW/EST MOD 40: CPT

## 2022-05-02 NOTE — ASSESSMENT
[FreeTextEntry1] : 36 year old female presents with bilateral tinnitus episodic vertigo and nausea triggered by change in position ( head flexed or upon standing) . On clinical exam noted few lateral beets of nystagmus to the the left and reduced sensation to pinprick on the right side of her face. \par \par Plan for ENT and vestibular testing referral\par Will order MRI brain IAC with and without contrast \par follow up in 6-8 weeks

## 2022-05-02 NOTE — CONSULT LETTER
[Dear  ___] : Dear  [unfilled], [Consult Letter:] : I had the pleasure of evaluating your patient, [unfilled]. [( Thank you for referring [unfilled] for consultation for _____ )] : Thank you for referring [unfilled] for consultation for [unfilled] [Please see my note below.] : Please see my note below. [Consult Closing:] : Thank you very much for allowing me to participate in the care of this patient.  If you have any questions, please do not hesitate to contact me.

## 2022-05-02 NOTE — PHYSICAL EXAM
[FreeTextEntry1] : Physical examination \par General: No acute distress, Awake, Alert.   \par  AC>BC\par \par Mental status \par Awake, alert, and oriented to person, time and place, Normal attention span and concentration, Recent and remote memory intact, Language intact, Fund of knowledge intact.   \par Able to spell "WORLD" backwards\par Able to serial 7 calculations\par Able to draw a clock\par Delayed recall \par \par Cranial Nerves \par II: VFF  \par III, IV, VI: PERRL, EOMI.  No RAPD. few beats of nystagmus on left end gaze\par V: Facial sensation is abnormal B/L.   Decreased PP left >> right V1,V2,V3, Cold intact.\par VII: Facial strength is normal B/L. \par \par \par VIII: Gross hearing is intact.   \par  \par \par IX, X: Palate is midline and elevates symmetrically.   \par XI: Trapezius normal strength.   \par XII: Tongue midline without atrophy or fasciculations. \par \par Motor exam  \par Muscle tone - no evidence of rigidity or resistance in all 4 extremities.  \par No atrophy or fasciculations \par Muscle Strength: arms and legs, proximal and distal flexors and extensors are normal \par \par No UE drift.\par \par Reflexes \par All present, normal, and symmetrical.   \par \par Diffuse hyporeflexia\par \par Diffuse hyperreflexia.  \par \par Plantars right: mute.   \par Plantars left: mute.   \par \par Absent ankle jerks.   \par \par Coordination \par Finger to nose: Normal.  \par Heel to shin: Normal.   \par \par Slow, no ataxia - FNF, VANDANA, HKS. \par \par Sensory \par Intact sensation to vibration and cold.\par \par Reduced distal sensation to cold and vibration. \par \par \par Gait \par Normal including heels, toes, and tandem gait.  \par \par Slow, wide based gait. \par \par

## 2022-05-02 NOTE — HISTORY OF PRESENT ILLNESS
[FreeTextEntry1] : Had COVID-19 January 2022 (teeth pain, congestion, fever, loss of voice) and started having tachycardia, ischemia, and autonomic dysfunction, and palpitations. Became more frequent since January. \par \par Stood up in afternoon and felt "shifting" in vision and felt she was off balance lasting less than one minute even though she was not moving. Afterwards, gray spots in visual field. No vision changes. Saw ophthalmology normal exam. Occurred a few months ago. Less frequent events, previously was having it weekly but still has occurrences lasting a few minutes. Never had a previous episode prior to COVID-19. No falls. No headaches. \par Could occur without movement. Denies having sensation when she turns in bed.\par Endorses constant ringing in ears bilaterally, left >right ear\par Did not do ENT or vestibular therapy.  Drinks 8-10 glasses of water per day.\par Denies numbness or tingling. \par Did cardiac monitor with no cause found. \par \par Occupation: . \par \par Takes Topamax 50mg BID for migraines. Has not missed any doses. \par \par Tilt table test 4 years ago caused syncope.

## 2022-05-02 NOTE — REVIEW OF SYSTEMS
[Dizziness] : dizziness [Negative] : Heme/Lymph [Confused or Disoriented] : no confusion [Memory Lapses or Loss] : no memory loss [Decr. Concentrating Ability] : no decrease in concentrating ability [Difficulty with Language] : no ~M difficulty with language [Changed Thought Patterns] : no change in thought patterns [Repeating Questions] : no repeated questioning about recent events [Facial Weakness] : no facial weakness [Arm Weakness] : no arm weakness [Hand Weakness] : no hand weakness [Leg Weakness] : no leg weakness [Poor Coordination] : good coordination [Difficulty Writing] : no difficulty writing [Difficulties in Speech] : no speech difficulties [Fainting] : no fainting [Lightheadedness] : no lightheadedness [Vertigo] : vertigo [Cluster Headache] : no cluster headache [Migraine Headache] : no migraine headache [Tension Headache] : no tension-type headache [Difficulty Walking] : no difficulty walking [Inability to Walk] : able to walk [Ataxia] : no ataxia [Frequent Falls] : not falling [Earache] : no earache [Loss Of Hearing] : no hearing loss [Nosebleeds] : no nosebleeds [Nasal Discharge] : no nasal discharge [Sore Throat] : no sore throat [Hoarseness] : no hoarseness [FreeTextEntry4] : + bilateral ringing in ear nonpulsatile

## 2022-08-08 ENCOUNTER — APPOINTMENT (OUTPATIENT)
Dept: HEART AND VASCULAR | Facility: CLINIC | Age: 37
End: 2022-08-08

## 2022-11-10 ENCOUNTER — NON-APPOINTMENT (OUTPATIENT)
Age: 37
End: 2022-11-10

## 2022-11-11 ENCOUNTER — APPOINTMENT (OUTPATIENT)
Dept: HEART AND VASCULAR | Facility: CLINIC | Age: 37
End: 2022-11-11

## 2022-11-11 VITALS
BODY MASS INDEX: 24.54 KG/M2 | RESPIRATION RATE: 18 BRPM | WEIGHT: 125 LBS | TEMPERATURE: 97.3 F | SYSTOLIC BLOOD PRESSURE: 108 MMHG | DIASTOLIC BLOOD PRESSURE: 60 MMHG | HEART RATE: 70 BPM | HEIGHT: 60 IN

## 2022-11-11 PROCEDURE — 93246 EXT ECG>7D<15D RECORDING: CPT

## 2022-11-12 LAB
ALBUMIN SERPL ELPH-MCNC: 5 G/DL
ALP BLD-CCNC: 53 U/L
ALT SERPL-CCNC: 11 U/L
ANION GAP SERPL CALC-SCNC: 13 MMOL/L
AST SERPL-CCNC: 16 U/L
BASOPHILS # BLD AUTO: 0.02 K/UL
BASOPHILS NFR BLD AUTO: 0.3 %
BILIRUB SERPL-MCNC: 0.7 MG/DL
BUN SERPL-MCNC: 11 MG/DL
CALCIUM SERPL-MCNC: 9.5 MG/DL
CHLORIDE SERPL-SCNC: 102 MMOL/L
CHOLEST SERPL-MCNC: 158 MG/DL
CO2 SERPL-SCNC: 24 MMOL/L
CREAT SERPL-MCNC: 0.71 MG/DL
EGFR: 112 ML/MIN/1.73M2
EOSINOPHIL # BLD AUTO: 0.06 K/UL
EOSINOPHIL NFR BLD AUTO: 1 %
ESTIMATED AVERAGE GLUCOSE: 88 MG/DL
FERRITIN SERPL-MCNC: 92 NG/ML
GLUCOSE SERPL-MCNC: 90 MG/DL
HBA1C MFR BLD HPLC: 4.7 %
HCT VFR BLD CALC: 40.1 %
HDLC SERPL-MCNC: 54 MG/DL
HGB BLD-MCNC: 12.9 G/DL
IMM GRANULOCYTES NFR BLD AUTO: 0.2 %
IRON SATN MFR SERPL: 66 %
IRON SERPL-MCNC: 195 UG/DL
LDLC SERPL CALC-MCNC: 93 MG/DL
LYMPHOCYTES # BLD AUTO: 1.61 K/UL
LYMPHOCYTES NFR BLD AUTO: 27.1 %
MAN DIFF?: NORMAL
MCHC RBC-ENTMCNC: 32 PG
MCHC RBC-ENTMCNC: 32.2 GM/DL
MCV RBC AUTO: 99.5 FL
MONOCYTES # BLD AUTO: 0.44 K/UL
MONOCYTES NFR BLD AUTO: 7.4 %
NEUTROPHILS # BLD AUTO: 3.8 K/UL
NEUTROPHILS NFR BLD AUTO: 64 %
NONHDLC SERPL-MCNC: 104 MG/DL
PLATELET # BLD AUTO: 236 K/UL
POTASSIUM SERPL-SCNC: 4.1 MMOL/L
PROT SERPL-MCNC: 7.6 G/DL
RBC # BLD: 4.03 M/UL
RBC # FLD: 12.7 %
SODIUM SERPL-SCNC: 140 MMOL/L
TIBC SERPL-MCNC: 296 UG/DL
TRIGL SERPL-MCNC: 54 MG/DL
TSH SERPL-ACNC: 1.33 UIU/ML
UIBC SERPL-MCNC: 101 UG/DL
WBC # FLD AUTO: 5.94 K/UL

## 2022-11-15 ENCOUNTER — NON-APPOINTMENT (OUTPATIENT)
Age: 37
End: 2022-11-15

## 2022-12-12 ENCOUNTER — APPOINTMENT (OUTPATIENT)
Dept: HEART AND VASCULAR | Facility: CLINIC | Age: 37
End: 2022-12-12

## 2022-12-12 VITALS
SYSTOLIC BLOOD PRESSURE: 128 MMHG | HEART RATE: 82 BPM | BODY MASS INDEX: 26.31 KG/M2 | TEMPERATURE: 97.6 F | RESPIRATION RATE: 16 BRPM | HEIGHT: 60 IN | DIASTOLIC BLOOD PRESSURE: 70 MMHG | OXYGEN SATURATION: 99 % | WEIGHT: 134 LBS

## 2022-12-12 PROCEDURE — 99215 OFFICE O/P EST HI 40 MIN: CPT

## 2022-12-12 RX ORDER — TOPIRAMATE 50 MG/1
50 TABLET, FILM COATED ORAL TWICE DAILY
Refills: 0 | Status: DISCONTINUED | COMMUNITY
Start: 2018-01-26 | End: 2022-12-12

## 2022-12-12 RX ORDER — B-COMPLEX WITH VITAMIN C
TABLET ORAL
Refills: 0 | Status: DISCONTINUED | COMMUNITY
End: 2022-12-12

## 2022-12-12 NOTE — REVIEW OF SYSTEMS
[Fever] : no fever [Headache] : no headache [Chills] : no chills [Feeling Fatigued] : not feeling fatigued [Cough] : no cough [Wheezing] : no wheezing [Coughing Up Blood] : no hemoptysis [Snoring] : no snoring [Dizziness] : dizziness [Tremor] : no tremor was seen [Numbness (Hypoesthesia)] : no numbness [Convulsions] : no convulsions [Tingling (Paresthesia)] : no tingling [Weakness] : no weakness [Limb Weakness (Paresis)] : no limb weakness (Paresis) [Speech Disturbance] : no speech disturbance [Confusion] : no confusion was observed [Memory Lapses Or Loss] : no memory lapses or loss [Depression] : no depression [Anxiety] : no anxiety [Under Stress] : not under stress [Suicidal] : not suicidal [Negative] : Heme/Lymph [FreeTextEntry2] : uneasy/unsteady feeling

## 2022-12-12 NOTE — HISTORY OF PRESENT ILLNESS
[FreeTextEntry1] : Harper Coffman returns for follow up.   \par \par She denies cp, sob, pnd, orthopnea, edema, or loc.  Her palps have worsened in frequency and intensity.  \par \par She continues to have intermittent lightheadedness (room shifted) with position change.  She is transiently unsteady.  She also noted having "white puffy spots" in her visual field that has been transient.  She feels "jittery/fluttery" in her chest and throat.  \par \par She is active.  She remains compliant with meds.\par \par ZIO 3/2022: APC/PVC; SVT\par CPET 3/2022: ischemic myocardial dysfxn; 86% predicted peak VO2 \par EXSE 3/2022: nl lv sys fxn; nl rojo fxn; 10:30 min Aldair; pos ECG; no ischemia by WM\par Telemetry 11/20221: AQPC/PVC/PAT/SVT\par \par Clinical hx and results reviewed in detail.\par \par Recommendations:\par 1. reassurance provided\par 2. hydrate and eat\par 3. exercise\par 4. continue current meds for now; increase atenolol to 12.5 mg BID\par 5. Neurology f/u\par 6. f/u in 2-3 weeks

## 2022-12-12 NOTE — DISCUSSION/SUMMARY
[Possible Cardiac Ischemia (Intermd Prob)] : possible cardiac ischemia (intermediate probability) [Dietary Modification] : dietary modification [Exercise Regimen] : an exercise regimen [PAT] : paroxysmal atrial tachycardia [PVCs] : ectopic ventricular beats [SVT] : paroxysmal supraventricular tachycardia [Vasovagal Syncope] : vasovagal syncope [Stable] : stable [None] : There are no changes in medication management [Patient] : the patient [de-identified] : autonomic dysfxn

## 2023-01-03 ENCOUNTER — APPOINTMENT (OUTPATIENT)
Dept: HEART AND VASCULAR | Facility: CLINIC | Age: 38
End: 2023-01-03
Payer: OTHER GOVERNMENT

## 2023-01-03 VITALS
RESPIRATION RATE: 16 BRPM | HEART RATE: 93 BPM | WEIGHT: 134 LBS | SYSTOLIC BLOOD PRESSURE: 110 MMHG | HEIGHT: 60 IN | DIASTOLIC BLOOD PRESSURE: 76 MMHG | BODY MASS INDEX: 26.31 KG/M2 | OXYGEN SATURATION: 99 % | TEMPERATURE: 97.8 F

## 2023-01-03 PROCEDURE — 99215 OFFICE O/P EST HI 40 MIN: CPT

## 2023-01-03 NOTE — HISTORY OF PRESENT ILLNESS
[FreeTextEntry1] : Harper Coffman returns for follow up.   \par \par She denies cp, sob, pnd, orthopnea, edema, or loc.  Her palps have not improved since her visit on 12/12/2022.  She continues to have intermittent lightheadedness (room shifted) with position change.  She is transiently unsteady.  She also noted having "white puffy spots" in her visual field that has been transient.  She feels "jittery/fluttery" in her chest and throat.  \par \par Increasing her dose of atenolol to 12.5 mg BID and changing the timing of her pm dose resulted in nausea (tried only for two days).\par \par She is active.  She remains compliant with meds.\par \par ZIO 3/2022: APC/PVC; SVT\par CPET 3/2022: ischemic myocardial dysfxn; 86% predicted peak VO2 \par EXSE 3/2022: nl lv sys fxn; nl rojo fxn; 10:30 min Aldair; pos ECG; no ischemia by WM\par Telemetry 11/20221: APC/PVC/PAT/SVT\par \par Clinical hx  reviewed in detail.\par \par Recommendations:\par 1. reassurance provided\par 2. hydrate and eat\par 3. exercise\par 4. continue current meds for now; change atenolol to 25 mg daily at bedtime for one week\par 5. Neurology f/u\par 6. EP eval\par 7. f/u in 1 week

## 2023-01-03 NOTE — DISCUSSION/SUMMARY
[Possible Cardiac Ischemia (Intermd Prob)] : possible cardiac ischemia (intermediate probability) [Dietary Modification] : dietary modification [Exercise Regimen] : an exercise regimen [PAT] : paroxysmal atrial tachycardia [PVCs] : ectopic ventricular beats [SVT] : paroxysmal supraventricular tachycardia [Deteriorating] : deteriorating [Medication Changes Per Orders] : Medication changes are as documented in orders [Vasovagal Syncope] : vasovagal syncope [Stable] : stable [None] : There are no changes in medication management [Patient] : the patient [de-identified] : autonomic dysfxn

## 2023-01-10 ENCOUNTER — APPOINTMENT (OUTPATIENT)
Dept: HEART AND VASCULAR | Facility: CLINIC | Age: 38
End: 2023-01-10
Payer: OTHER GOVERNMENT

## 2023-01-10 VITALS
SYSTOLIC BLOOD PRESSURE: 100 MMHG | WEIGHT: 134 LBS | TEMPERATURE: 97.7 F | OXYGEN SATURATION: 98 % | RESPIRATION RATE: 16 BRPM | DIASTOLIC BLOOD PRESSURE: 60 MMHG | BODY MASS INDEX: 26.31 KG/M2 | HEIGHT: 60 IN | HEART RATE: 76 BPM

## 2023-01-10 PROCEDURE — 99215 OFFICE O/P EST HI 40 MIN: CPT

## 2023-01-10 RX ORDER — MECLIZINE HYDROCHLORIDE 25 MG/1
25 TABLET ORAL
Qty: 30 | Refills: 0 | Status: DISCONTINUED | COMMUNITY
Start: 2022-11-28 | End: 2023-01-10

## 2023-01-10 RX ORDER — FLUCONAZOLE 150 MG/1
150 TABLET ORAL
Qty: 1 | Refills: 0 | Status: DISCONTINUED | COMMUNITY
Start: 2022-11-28 | End: 2023-01-10

## 2023-01-13 ENCOUNTER — APPOINTMENT (OUTPATIENT)
Dept: HEART AND VASCULAR | Facility: CLINIC | Age: 38
End: 2023-01-13

## 2023-01-16 NOTE — DISCUSSION/SUMMARY
[Possible Cardiac Ischemia (Intermd Prob)] : possible cardiac ischemia (intermediate probability) [Dietary Modification] : dietary modification [Exercise Regimen] : an exercise regimen [PAT] : paroxysmal atrial tachycardia [PVCs] : ectopic ventricular beats [SVT] : paroxysmal supraventricular tachycardia [Responding to Treatment] : responding to treatment [Vasovagal Syncope] : vasovagal syncope [Stable] : stable [None] : There are no changes in medication management [Patient] : the patient [de-identified] : autonomic dysfxn

## 2023-01-16 NOTE — HISTORY OF PRESENT ILLNESS
[FreeTextEntry1] : Harper Coffman returns for follow up.   \par \par She denies cp, sob, pnd, orthopnea, edema, or loc.  Her palps have improved since her visit on 1/03/2023.  \par \par She is active.  She remains compliant with meds.\par \par ZIO 3/2022: APC/PVC; SVT\par CPET 3/2022: ischemic myocardial dysfxn; 86% predicted peak VO2 \par EXSE 3/2022: nl lv sys fxn; nl rojo fxn; 10:30 min Aldair; pos ECG; no ischemia by WM\par Telemetry 11/20221: APC/PVC/PAT/SVT\par \par Clinical hx  reviewed in detail.\par \par Recommendations:\par 1. reassurance provided\par 2. hydrate and eat\par 3. exercise\par 4. continue current meds for now-  atenolol to 25 mg daily at bedtime\par 5. Neurology f/u\par 6. f/u in 3 months

## 2023-01-18 RX ORDER — ATENOLOL 25 MG/1
25 TABLET ORAL
Qty: 45 | Refills: 3 | Status: DISCONTINUED | COMMUNITY
Start: 2021-06-13 | End: 2023-01-18

## 2023-04-11 ENCOUNTER — APPOINTMENT (OUTPATIENT)
Dept: HEART AND VASCULAR | Facility: CLINIC | Age: 38
End: 2023-04-11
Payer: OTHER GOVERNMENT

## 2023-04-11 ENCOUNTER — TRANSCRIPTION ENCOUNTER (OUTPATIENT)
Age: 38
End: 2023-04-11

## 2023-04-11 VITALS
HEART RATE: 72 BPM | TEMPERATURE: 97.3 F | HEIGHT: 60 IN | SYSTOLIC BLOOD PRESSURE: 99 MMHG | BODY MASS INDEX: 26.31 KG/M2 | WEIGHT: 134 LBS | RESPIRATION RATE: 16 BRPM | DIASTOLIC BLOOD PRESSURE: 80 MMHG | OXYGEN SATURATION: 99 %

## 2023-04-11 DIAGNOSIS — R55 SYNCOPE AND COLLAPSE: ICD-10-CM

## 2023-04-11 PROCEDURE — 99215 OFFICE O/P EST HI 40 MIN: CPT

## 2023-04-11 NOTE — REVIEW OF SYSTEMS
[Fever] : no fever [Headache] : no headache [Chills] : no chills [Feeling Fatigued] : not feeling fatigued [Cough] : no cough [Wheezing] : no wheezing [Coughing Up Blood] : no hemoptysis [Snoring] : no snoring [Dizziness] : no dizziness [Tremor] : no tremor was seen [Numbness (Hypoesthesia)] : no numbness [Convulsions] : no convulsions [Tingling (Paresthesia)] : no tingling [Weakness] : no weakness [Limb Weakness (Paresis)] : no limb weakness (Paresis) [Speech Disturbance] : no speech disturbance [Confusion] : no confusion was observed [Memory Lapses Or Loss] : no memory lapses or loss [Depression] : no depression [Anxiety] : no anxiety [Under Stress] : not under stress [Suicidal] : not suicidal [Negative] : Heme/Lymph [FreeTextEntry2] : uneasy/unsteady feeling

## 2023-04-11 NOTE — HISTORY OF PRESENT ILLNESS
[FreeTextEntry1] : Harper Coffman returns for follow up.   \par \par She denies cp, sob, pnd, orthopnea, edema, or loc.  Her palps have improved since her visit on 1/03/2023.  \par \par She is active.  She remains compliant with meds.\par \par ZIO 3/2022: APC/PVC; SVT\par CPET 3/2022: ischemic myocardial dysfxn; 86% predicted peak VO2 \par EXSE 3/2022: nl lv sys fxn; nl rojo fxn; 10:30 min Aldair; pos ECG; no ischemia by WM\par Telemetry 11/20221: APC/PVC/PAT/SVT\par \par Clinical hx  reviewed in detail.\par \par Recommendations:\par 1. reassurance provided; lengthy discussion re stress/anxiety management - meds discussed but not prescribed at this time\par 2. hydrate and eat\par 3. exercise\par 4. continue current meds for now-  atenolol to 25 mg daily at bedtime\par 5. Neurology f/u\par 6. EXSE/CPET

## 2023-04-11 NOTE — DISCUSSION/SUMMARY
[Possible Cardiac Ischemia (Intermd Prob)] : possible cardiac ischemia (intermediate probability) [Dietary Modification] : dietary modification [Exercise Regimen] : an exercise regimen [PAT] : paroxysmal atrial tachycardia [PVCs] : ectopic ventricular beats [SVT] : paroxysmal supraventricular tachycardia [Vasovagal Syncope] : vasovagal syncope [Stable] : stable [None] : There are no changes in medication management [Patient] : the patient [de-identified] : autonomic dysfxn

## 2023-10-07 ENCOUNTER — NON-APPOINTMENT (OUTPATIENT)
Age: 38
End: 2023-10-07

## 2023-10-12 ENCOUNTER — APPOINTMENT (OUTPATIENT)
Dept: HEART AND VASCULAR | Facility: CLINIC | Age: 38
End: 2023-10-12
Payer: OTHER GOVERNMENT

## 2023-10-12 VITALS
SYSTOLIC BLOOD PRESSURE: 102 MMHG | BODY MASS INDEX: 27.48 KG/M2 | HEIGHT: 60 IN | DIASTOLIC BLOOD PRESSURE: 70 MMHG | WEIGHT: 140 LBS | OXYGEN SATURATION: 99 %

## 2023-10-12 PROCEDURE — 93320 DOPPLER ECHO COMPLETE: CPT

## 2023-10-12 PROCEDURE — 93325 DOPPLER ECHO COLOR FLOW MAPG: CPT

## 2023-10-12 PROCEDURE — 93351 STRESS TTE COMPLETE: CPT

## 2023-10-17 ENCOUNTER — APPOINTMENT (OUTPATIENT)
Dept: HEART AND VASCULAR | Facility: CLINIC | Age: 38
End: 2023-10-17
Payer: OTHER GOVERNMENT

## 2023-10-17 VITALS
DIASTOLIC BLOOD PRESSURE: 80 MMHG | HEART RATE: 80 BPM | HEIGHT: 59 IN | SYSTOLIC BLOOD PRESSURE: 110 MMHG | WEIGHT: 141 LBS | BODY MASS INDEX: 28.43 KG/M2

## 2023-10-17 PROCEDURE — 94729 DIFFUSING CAPACITY: CPT

## 2023-10-17 PROCEDURE — 94727 GAS DIL/WSHOT DETER LNG VOL: CPT

## 2023-10-17 PROCEDURE — 94010 BREATHING CAPACITY TEST: CPT | Mod: 59

## 2023-10-17 PROCEDURE — 94621 CARDIOPULM EXERCISE TESTING: CPT

## 2023-10-27 ENCOUNTER — APPOINTMENT (OUTPATIENT)
Dept: HEART AND VASCULAR | Facility: CLINIC | Age: 38
End: 2023-10-27

## 2023-10-27 ENCOUNTER — APPOINTMENT (OUTPATIENT)
Dept: HEART AND VASCULAR | Facility: CLINIC | Age: 38
End: 2023-10-27
Payer: OTHER GOVERNMENT

## 2023-10-27 VITALS
SYSTOLIC BLOOD PRESSURE: 120 MMHG | WEIGHT: 141 LBS | RESPIRATION RATE: 16 BRPM | HEIGHT: 59 IN | BODY MASS INDEX: 28.43 KG/M2 | DIASTOLIC BLOOD PRESSURE: 76 MMHG | TEMPERATURE: 97.6 F | OXYGEN SATURATION: 99 % | HEART RATE: 72 BPM

## 2023-10-27 DIAGNOSIS — R42 DIZZINESS AND GIDDINESS: ICD-10-CM

## 2023-10-27 DIAGNOSIS — I25.9 CHRONIC ISCHEMIC HEART DISEASE, UNSPECIFIED: ICD-10-CM

## 2023-10-27 DIAGNOSIS — R94.39 ABNORMAL RESULT OF OTHER CARDIOVASCULAR FUNCTION STUDY: ICD-10-CM

## 2023-10-27 DIAGNOSIS — G90.9 DISORDER OF THE AUTONOMIC NERVOUS SYSTEM, UNSPECIFIED: ICD-10-CM

## 2023-10-27 DIAGNOSIS — R00.2 PALPITATIONS: ICD-10-CM

## 2023-10-27 DIAGNOSIS — I25.10 ATHEROSCLEROTIC HEART DISEASE OF NATIVE CORONARY ARTERY W/OUT ANGINA PECTORIS: ICD-10-CM

## 2023-10-27 PROCEDURE — 99215 OFFICE O/P EST HI 40 MIN: CPT

## 2024-03-30 RX ORDER — ATENOLOL 25 MG/1
25 TABLET ORAL DAILY
Qty: 90 | Refills: 3 | Status: ACTIVE | COMMUNITY
Start: 2023-01-18 | End: 1900-01-01

## 2024-04-18 ENCOUNTER — NON-APPOINTMENT (OUTPATIENT)
Age: 39
End: 2024-04-18

## 2024-04-18 ENCOUNTER — APPOINTMENT (OUTPATIENT)
Dept: HEART AND VASCULAR | Facility: CLINIC | Age: 39
End: 2024-04-18
Payer: OTHER GOVERNMENT

## 2024-04-18 VITALS
DIASTOLIC BLOOD PRESSURE: 70 MMHG | HEIGHT: 59 IN | OXYGEN SATURATION: 99 % | HEART RATE: 80 BPM | BODY MASS INDEX: 31.25 KG/M2 | SYSTOLIC BLOOD PRESSURE: 130 MMHG | TEMPERATURE: 98.7 F | WEIGHT: 155 LBS

## 2024-04-18 PROCEDURE — 93000 ELECTROCARDIOGRAM COMPLETE: CPT

## 2024-04-18 PROCEDURE — 99214 OFFICE O/P EST MOD 30 MIN: CPT

## 2024-04-18 RX ORDER — NAPROXEN SODIUM 550 MG/1
550 TABLET ORAL
Qty: 180 | Refills: 0 | Status: DISCONTINUED | COMMUNITY
Start: 2016-10-24 | End: 2024-04-18

## 2024-05-05 NOTE — ASSESSMENT
[FreeTextEntry1] : 39 yo female with h/o dysautonomia, atrial tachycardia here for follow up care. #Syncopal/near syncopal episodes --atleast since 2022 --cardiac workup so far negative - autonomic dysfxn vs. vasovagal syncope --continued neuro follow up --continue ENT follow for vestibular therapy as previously recommended --may benefit from tilt table test --adequate hydration, PO intake recommended #Atrial tachycardia --per patient, she was previously recommended for EP eval if persistent symptoms --continue beta blocker --will refer to EP as patient is still symptomatic --vagal maneuvers reviewed --avoid stimulants/OTC decongestants or excess caffeine/EtOH use  Will follow up in 3-6 months

## 2024-05-05 NOTE — HISTORY OF PRESENT ILLNESS
[FreeTextEntry1] : 39 yo female with h/o dysautonomia, atrial tachycardia here for follow up care. Previously seen by Dr. Velasquez for palpitations and chest discomfort - started on beta blocker. REports symptoms have been mostly improved over the past several months. However two days had on and off symptoms for the entire day. Describes it as a "car sickness" sensation when her extremities get numb and she gets palpitations and near-syncopal. On Tuesday she was standing at the counter of a grocery stores when she felt similar symptoms and as if she was going to pass out.  3 months ago she had another episode when she lost consciousness briefly after she went to the bathroom to urinate. Felt similar to what she experienced this week and was preceded by palpitations. She has stuck to adequate hydration and gatorade daily. Feels that metoprolol and celexa help but she has been gaining weight lately. Was recommended for an ablation if her symptoms were not controlled with the beta blocker in the past. Exercises but she does not feel good after exercise. Previously used to run up to 10 miles; however does not do that anymore. Denies any chest discomfort, but after exercise feels similar symptoms that do not "feel good". Limited caffeine intake (only uses decaf). Denies any significant EtOH or tobacco use.  Cardiac workup:  ZIO 3/2022: APC/PVC; SVT CPET 3/2022: ischemic myocardial dysfxn; 86% predicted peak VO2 EXSE 3/2022: nl lv sys fxn; nl rojo fxn; 10:30 min Ald[place]; pos ECG; no ischemia by WM Telemetry 11/2022: APC/PVC/PAT/SVT Telemetry 10/2023: AT/SR/SA/SB/ST CPET 10/2023: ischemic myocardial dysfxn; 78% predicted peak VO2 EXSE 10/2023: nl lv sys fxn; mild TR 10:20 min Aldair; pos ECG; no ischemia by WM. CCTA 03/2018: no significant CAD, normal coronaries. CAC 0

## 2024-05-22 ENCOUNTER — NON-APPOINTMENT (OUTPATIENT)
Age: 39
End: 2024-05-22

## 2024-05-22 ENCOUNTER — APPOINTMENT (OUTPATIENT)
Dept: HEART AND VASCULAR | Facility: CLINIC | Age: 39
End: 2024-05-22
Payer: OTHER GOVERNMENT

## 2024-05-22 VITALS
WEIGHT: 160 LBS | OXYGEN SATURATION: 99 % | HEIGHT: 59 IN | SYSTOLIC BLOOD PRESSURE: 130 MMHG | BODY MASS INDEX: 32.25 KG/M2 | DIASTOLIC BLOOD PRESSURE: 72 MMHG | HEART RATE: 82 BPM | RESPIRATION RATE: 16 BRPM | TEMPERATURE: 98.7 F

## 2024-05-22 DIAGNOSIS — Z78.9 OTHER SPECIFIED HEALTH STATUS: ICD-10-CM

## 2024-05-22 DIAGNOSIS — Z82.49 FAMILY HISTORY OF ISCHEMIC HEART DISEASE AND OTHER DISEASES OF THE CIRCULATORY SYSTEM: ICD-10-CM

## 2024-05-22 DIAGNOSIS — Z83.3 FAMILY HISTORY OF DIABETES MELLITUS: ICD-10-CM

## 2024-05-22 DIAGNOSIS — Z86.69 PERSONAL HISTORY OF OTHER DISEASES OF THE NERVOUS SYSTEM AND SENSE ORGANS: ICD-10-CM

## 2024-05-22 DIAGNOSIS — Z87.19 PERSONAL HISTORY OF OTHER DISEASES OF THE DIGESTIVE SYSTEM: ICD-10-CM

## 2024-05-22 PROCEDURE — 99215 OFFICE O/P EST HI 40 MIN: CPT

## 2024-05-22 PROCEDURE — 93000 ELECTROCARDIOGRAM COMPLETE: CPT

## 2024-05-22 NOTE — REASON FOR VISIT
[Arrhythmia/ECG Abnorrmalities] : arrhythmia/ECG abnormalities [Other: ____] : [unfilled] [FreeTextEntry3] : kathy

## 2024-05-22 NOTE — HISTORY OF PRESENT ILLNESS
[FreeTextEntry1] : Ms. ANGELA GANT was seen today at the [facility]. she is a 38 year woman that was referred by Dr Duff for recurrent syncope and palpitations.   reports symptoms of syncope or near syncope typically preceded by palpitations that first began five years ago.  Most episodes occur while standing upright and resolve with seated position.  She underwent tilt table testing at [facility] and baseline testing did not produce a response (she ultimately passed out following SL NTG administration- a non specific finding).  Subsequent work up revealed a structurally normal heart and she was treated with atenolol.  In January, she had a syncopal episode after sitting on the toilet to urinate and found herself on the floor. Subsequent mobile telemetry monitor demonstrates normal sinus rhythm with a short run of non sustained atrial arrhythmia- AT.  Interestingly she has several episode of sinus tachycardia in the middle of the night (clear slow onset/offset on trend).  Ms Gant reported that while wearing the monitor she did not have her typical symptoms of near syncope but did report palpitations.  She also reports periods of palpitations and gasping for air in the middle of the night.  Today she feels well but is concerned and started Citalopram therapy for anxiety.  Past Medical History GERD Migraine H/A  Constitutional: WN WD WF NAD Eyes: Sclera white. PERRLA. EOMI. ENMT: No obvious oral lesions. Oropharynx clear. No palpable neck masses. Extremities: No C/C/E. CV: No JVD or carotid bruit. PMI nl. S1S2 RRR No M/R/H/G Lungs: CTA & P Abd: +BS, NT/ND no HSM : Deferred Skin: no lesions or rash Neuro: A&Ox3, non focal Psych: no abnl behaviors during exam Mallampati 2-3  ECG NSR 75, normal axis, normal intervals, T wave inversion in lead III.  Echocardiogram - Preserved LVEF without valvular abnormalities   Stress echo- 10/2023- MPHR 95%, 12 METS. No ischemia on echo images.   [facility] 10/2023  15 symptom recordings 14 associated with slow onset/offset tachycardia consistent with a sinus mechanism.  One symptoms associated with a 7 beat run of AT.

## 2024-05-22 NOTE — DISCUSSION/SUMMARY
[FreeTextEntry1] : Impression 1. 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 Palpitations with periods of sinus tachycardia on MCOT  Plan Ms. Coffman suffers from recurrent syncope and near syncope as well as palpitations.  Tilt table testing in the past was unrevealing and multiple MCOT studies have failed to capture episodes when she is symptomatic. 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  Will arrange for ILR to correlate episodes with heart rhythm and determine whether she suffers from cardioinhibitory component.  Would also consider screening again for HERLINDA.  I reviewed b/r/a to ILR Implant.  WIll plan to schedule for June 4th. [EKG obtained to assist in diagnosis and management of assessed problem(s)] : EKG obtained to assist in diagnosis and management of assessed problem(s)

## 2024-08-14 ENCOUNTER — APPOINTMENT (OUTPATIENT)
Dept: HEART AND VASCULAR | Facility: CLINIC | Age: 39
End: 2024-08-14

## 2024-08-14 PROCEDURE — 93298 REM INTERROG DEV EVAL SCRMS: CPT

## 2024-09-18 ENCOUNTER — APPOINTMENT (OUTPATIENT)
Dept: HEART AND VASCULAR | Facility: CLINIC | Age: 39
End: 2024-09-18

## 2024-09-18 PROCEDURE — 93298 REM INTERROG DEV EVAL SCRMS: CPT

## 2024-10-23 ENCOUNTER — APPOINTMENT (OUTPATIENT)
Dept: HEART AND VASCULAR | Facility: CLINIC | Age: 39
End: 2024-10-23

## 2024-10-23 PROCEDURE — 93298 REM INTERROG DEV EVAL SCRMS: CPT

## 2024-11-27 ENCOUNTER — APPOINTMENT (OUTPATIENT)
Dept: HEART AND VASCULAR | Facility: CLINIC | Age: 39
End: 2024-11-27

## 2024-11-27 PROCEDURE — 93298 REM INTERROG DEV EVAL SCRMS: CPT

## 2024-12-30 ENCOUNTER — NON-APPOINTMENT (OUTPATIENT)
Age: 39
End: 2024-12-30

## 2024-12-31 ENCOUNTER — APPOINTMENT (OUTPATIENT)
Dept: HEART AND VASCULAR | Facility: CLINIC | Age: 39
End: 2024-12-31

## 2024-12-31 PROCEDURE — 93298 REM INTERROG DEV EVAL SCRMS: CPT

## 2025-02-04 ENCOUNTER — APPOINTMENT (OUTPATIENT)
Dept: HEART AND VASCULAR | Facility: CLINIC | Age: 40
End: 2025-02-04
Payer: OTHER GOVERNMENT

## 2025-02-04 ENCOUNTER — NON-APPOINTMENT (OUTPATIENT)
Age: 40
End: 2025-02-04

## 2025-02-04 PROCEDURE — 93298 REM INTERROG DEV EVAL SCRMS: CPT

## 2025-03-11 ENCOUNTER — APPOINTMENT (OUTPATIENT)
Dept: HEART AND VASCULAR | Facility: CLINIC | Age: 40
End: 2025-03-11
Payer: OTHER GOVERNMENT

## 2025-03-11 ENCOUNTER — NON-APPOINTMENT (OUTPATIENT)
Age: 40
End: 2025-03-11

## 2025-03-11 PROCEDURE — 93298 REM INTERROG DEV EVAL SCRMS: CPT

## 2025-04-15 ENCOUNTER — NON-APPOINTMENT (OUTPATIENT)
Age: 40
End: 2025-04-15

## 2025-04-15 ENCOUNTER — APPOINTMENT (OUTPATIENT)
Dept: HEART AND VASCULAR | Facility: CLINIC | Age: 40
End: 2025-04-15
Payer: OTHER GOVERNMENT

## 2025-04-15 PROCEDURE — 93298 REM INTERROG DEV EVAL SCRMS: CPT

## 2025-05-20 ENCOUNTER — NON-APPOINTMENT (OUTPATIENT)
Age: 40
End: 2025-05-20

## 2025-05-20 ENCOUNTER — APPOINTMENT (OUTPATIENT)
Dept: HEART AND VASCULAR | Facility: CLINIC | Age: 40
End: 2025-05-20
Payer: OTHER GOVERNMENT

## 2025-05-20 PROCEDURE — 93298 REM INTERROG DEV EVAL SCRMS: CPT

## 2025-06-24 ENCOUNTER — APPOINTMENT (OUTPATIENT)
Dept: HEART AND VASCULAR | Facility: CLINIC | Age: 40
End: 2025-06-24
Payer: OTHER GOVERNMENT

## 2025-06-24 ENCOUNTER — NON-APPOINTMENT (OUTPATIENT)
Age: 40
End: 2025-06-24

## 2025-06-24 PROCEDURE — 93298 REM INTERROG DEV EVAL SCRMS: CPT

## 2025-07-29 ENCOUNTER — NON-APPOINTMENT (OUTPATIENT)
Age: 40
End: 2025-07-29

## 2025-07-29 ENCOUNTER — APPOINTMENT (OUTPATIENT)
Dept: HEART AND VASCULAR | Facility: CLINIC | Age: 40
End: 2025-07-29

## 2025-07-29 PROCEDURE — 93298 REM INTERROG DEV EVAL SCRMS: CPT

## 2025-09-15 ENCOUNTER — APPOINTMENT (OUTPATIENT)
Dept: HEART AND VASCULAR | Facility: CLINIC | Age: 40
End: 2025-09-15